# Patient Record
Sex: FEMALE | Race: WHITE
[De-identification: names, ages, dates, MRNs, and addresses within clinical notes are randomized per-mention and may not be internally consistent; named-entity substitution may affect disease eponyms.]

---

## 2020-03-27 ENCOUNTER — HOSPITAL ENCOUNTER (OUTPATIENT)
Dept: HOSPITAL 62 - RDC | Age: 25
End: 2020-03-27
Attending: NURSE PRACTITIONER
Payer: OTHER GOVERNMENT

## 2020-03-27 VITALS — SYSTOLIC BLOOD PRESSURE: 100 MMHG | DIASTOLIC BLOOD PRESSURE: 56 MMHG

## 2020-03-27 DIAGNOSIS — F17.290: ICD-10-CM

## 2020-03-27 DIAGNOSIS — Z20.828: Primary | ICD-10-CM

## 2020-03-27 DIAGNOSIS — R05: ICD-10-CM

## 2020-03-27 LAB
A TYPE INFLUENZA AG: NEGATIVE
B INFLUENZA AG: NEGATIVE

## 2020-03-27 PROCEDURE — 87070 CULTURE OTHR SPECIMN AEROBIC: CPT

## 2020-03-27 PROCEDURE — 87880 STREP A ASSAY W/OPTIC: CPT

## 2020-03-27 PROCEDURE — 87804 INFLUENZA ASSAY W/OPTIC: CPT

## 2020-03-27 PROCEDURE — 87635 SARS-COV-2 COVID-19 AMP PRB: CPT

## 2020-03-27 NOTE — ER RDC ASSESSMENT REPORT
Intake





- In the Last 14 days


Have you traveled outside [place]?: No


Have you been in close contact with someone CONFIRMED: No


Worked in Healthcare?: No





- Symptoms


Subjective Fever(Felt feverish): Yes


Chills: No


Muscule Aches: No


Runny Nose: Yes


Sore Throat: No


Cough (New or worsening chronic cough): Yes


--How many day(s)?: Reports dry cough


Shortness of breath: Yes


Nausea or Vomiting: No


Headache: Yes


Abdominal Pain: No


Diarrhea(3 or more loose stools in last 24 hours): No





- Do you have any of the following


Chronic lung disease: Asthma or emphysema or COPD: No


Cystic Fibrosis: No


Diabetes: No


High Blood Pressure: No


Cardiovascular Disease: No


Chronic Liver Disease: No


Chronic blood disorder like Sickle Cell Disease: No


Weak immune system due to disease or medication: No


Neurologic condition that limits movement: No


Developmental delay - Moderate to Severe: No


Recent (within past 2 weeks) or current Pregnancy: No


Morbid Obesity (>100 pounds over ideal weight): No





- Objective


Temperature: 98.2 F


Pulse Rate: 76


Respiratory Rate: 18


Blood Pressure: 100/56


O2 Sat by Pulse Oximetry: 98


Objective: 


Given above, testing performed: 
































If Testing Performed:


Test Specimen Type Sent to











General





- General


Information source: Patient


Notes: 





Patient here for Covid testing reports symptoms of respiratory symptoms since 

March 15.  Reports a dry cough states Vapes





Past Medical History





- Social History


Smoking Status: Current Every Day Smoker - Reports Vapes


Smoking Education Provided: Yes - Urged to stop vaping





Physical Exam





- General


In distress: None


Notes: 





PHYSICAL EXAMINATION: 


GENERAL: Well-appearing and in no acute distress. 


HEAD: Atraumatic, normocephalic. 


EYES: sclera anicteric, conjunctiva are normal. 


ENT: nares patent. Moist mucous membranes. 


NECK: Normal range of motion, supple without lymphadenopathy 


LUNGS: CTAB and equal. No wheezes rales or rhonchi. 


HEART: Regular rate and rhythm without murmurs 


ABDOMEN: Soft, nontender, normal bowel sounds, no guarding. 


NEUROLOGICAL: Normal speech.


PSYCH: Normal mood, normal affect. 


SKIN: Warm, Dry, normal turgor.








Patient Education/Counseling


Guidance for worsening S/SX: 





Patient presents with upper respiratory symptoms worrisome for possible Covid 

19.  Patient does not have emergency worring symptoms such as difficulty breath

ing, shortness of breath, chest pain, pressure, confusion or cyanosis.  Patient 

appears suitable for discharge Patient's vital signs are stable and patient is 

nontoxic in appearance.  Good return precautions have been discussed with 

patient, patient verbalized understanding and is agreeable with discharge plan 

of care at this time.


Patient provided COVID DC instructions including:


As a person under investigation for Covid 19, the [facility] of 

Health and Human Services, division of public health advises you to adhere to 

the following guidance until your test results are reported to you.  If your 

test result is positive, you will receive additional information from your 

provider and your local health department at that time.


 


Remain at home until you are cleared by the health provider or public health 

authorities.


 


Keep a log of visitors to your home, notify any visitors to your home of your 

isolation status.


 


If you plan to move to a new address or leave the county, notify the local 

health department in your County.


 


Call your doctor or seek care if you have an urgent medical need.  Before 

seeking medical care, call ahead to get instructions from the provider before 

arriving at the medical office clinic or hospital.  Notify them that you are 

being tested for the virus that causes Covid 19 so that arrangements can be 

made, as necessary, to prevent transmission to others in the healthcare setting.

 Next, notify the local health department in your county.


 


If a medical emergency arises and you need to call 911, inform the first 

responders that you are being tested for the virus that causes Covid 19.  Next, 

notify the local health department in your county.





RDC Discharge





- Discharge


Clinical Impression: 


 COVID 19 Screening





Upper respiratory infection


Qualifiers:


 Pharyngitis/tonsillitis etiology: unspecified etiology 





Condition: Stable


Disposition: Home; Selfcare